# Patient Record
Sex: FEMALE | Race: WHITE | NOT HISPANIC OR LATINO | Employment: UNEMPLOYED | URBAN - METROPOLITAN AREA
[De-identification: names, ages, dates, MRNs, and addresses within clinical notes are randomized per-mention and may not be internally consistent; named-entity substitution may affect disease eponyms.]

---

## 2023-03-22 ENCOUNTER — OFFICE VISIT (OUTPATIENT)
Dept: URGENT CARE | Facility: CLINIC | Age: 53
End: 2023-03-22

## 2023-03-22 VITALS
RESPIRATION RATE: 20 BRPM | SYSTOLIC BLOOD PRESSURE: 143 MMHG | HEART RATE: 120 BPM | OXYGEN SATURATION: 96 % | TEMPERATURE: 97.1 F | DIASTOLIC BLOOD PRESSURE: 100 MMHG

## 2023-03-22 DIAGNOSIS — B34.9 VIRAL ILLNESS: Primary | ICD-10-CM

## 2023-03-22 RX ORDER — BUPROPION HYDROCHLORIDE 75 MG/1
75 TABLET ORAL 2 TIMES DAILY
COMMUNITY
Start: 2023-01-23 | End: 2024-01-23

## 2023-03-22 RX ORDER — VENLAFAXINE HYDROCHLORIDE 75 MG/1
75 CAPSULE, EXTENDED RELEASE ORAL
COMMUNITY
Start: 2023-01-23 | End: 2024-01-23

## 2023-03-22 RX ORDER — FLUTICASONE PROPIONATE 50 MCG
2 SPRAY, SUSPENSION (ML) NASAL DAILY
COMMUNITY
Start: 2023-01-23 | End: 2024-01-23

## 2023-03-22 NOTE — PROGRESS NOTES
[id number]        NAME: Earlene Blanc is a 46 y o  female  : 1970    MRN: [id number]  DATE: 2023  TIME: 3:10 PM    Assessment and Orders   Viral illness [B34 9]  1  Viral illness              Plan and Discussion      Rapid COVID negative  Symptoms and exam consistent with viral illness  At this time, my suspicions for Lyme disease are low (no target rash, tick not seen)  Did offer patient lab work to confirm that she is lyme negative - patient refused  Patient's symptoms of fatigue and body aches are likely viral  If she continues to have problems, patient should be seen by PCP to have general lab work done  Discussed ED precautions including (but not limited to)  • Difficultly breathing or shortness of breath  • Chest pain  • Acutely worsening symptoms  Risks and benefits discussed  Patient understands and agrees with the plan  Follow up with PCP  Chief Complaint     Chief Complaint   Patient presents with   • Headache     States she was recently on vacation and believes she was bit by a tick  States 3 bite marks to her back  Verbalizes joint pain, muscle pain, increased fatigue, and H/a since Monday  No interventions attempted         History of Present Illness       Patient is a 47 yo F who presents with generalized joint pain, headaches and fatigue starting early this week  She states that she was in [place] last week and noted a scab on her right flank that was very itchy  She is concerned it was a tick (no insect was ever seen)  She feels sweaty but no fevers  No throat pain  Does not some bilateral ear pressure  Headache      Review of Systems   Review of Systems   Neurological: Positive for headaches           Current Medications       Current Outpatient Medications:   •  buPROPion (WELLBUTRIN) 75 mg tablet, Take 75 mg by mouth 2 (two) times a day, Disp: , Rfl:   •  fluticasone (FLONASE) 50 mcg/act nasal spray, 2 sprays into each nostril daily, Disp: , Rfl:   •  venlafaxine (EFFEXOR-XR) 75 mg 24 hr capsule, Take 75 mg by mouth, Disp: , Rfl:     Current Allergies     Allergies as of 03/22/2023 - Reviewed 03/22/2023   Allergen Reaction Noted   • Sulfamethoxazole-trimethoprim Other (See Comments) 04/09/2015            The following portions of the patient's history were reviewed and updated as appropriate: allergies, current medications, past family history, past medical history, past social history, past surgical history and problem list      History reviewed  No pertinent past medical history  History reviewed  No pertinent surgical history  History reviewed  No pertinent family history  Medications have been verified  Objective   /100 (BP Location: Right arm, Patient Position: Sitting, Cuff Size: Standard)   Pulse (!) 120   Temp (!) 97 1 °F (36 2 °C) (Temporal)   Resp 20   SpO2 96%   No LMP recorded  Physical Exam     Physical Exam  Constitutional:       General: She is not in acute distress  Appearance: She is not ill-appearing  HENT:      Right Ear: Tympanic membrane normal       Left Ear: Tympanic membrane normal    Pulmonary:      Effort: No respiratory distress  Skin:         Neurological:      General: No focal deficit present  Mental Status: She is alert and oriented to person, place, and time     Psychiatric:         Mood and Affect: Mood normal          Behavior: Behavior normal                Willa Jones DO